# Patient Record
Sex: FEMALE | Race: WHITE | Employment: PART TIME | URBAN - METROPOLITAN AREA
[De-identification: names, ages, dates, MRNs, and addresses within clinical notes are randomized per-mention and may not be internally consistent; named-entity substitution may affect disease eponyms.]

---

## 2021-04-10 ENCOUNTER — HOSPITAL ENCOUNTER (EMERGENCY)
Age: 21
Discharge: HOME | End: 2021-04-10
Attending: EMERGENCY MEDICINE
Payer: COMMERCIAL

## 2021-04-10 VITALS
BODY MASS INDEX: 22.5 KG/M2 | OXYGEN SATURATION: 98 % | RESPIRATION RATE: 20 BRPM | DIASTOLIC BLOOD PRESSURE: 82 MMHG | WEIGHT: 140 LBS | SYSTOLIC BLOOD PRESSURE: 139 MMHG | HEART RATE: 98 BPM | HEIGHT: 66 IN | TEMPERATURE: 98.4 F

## 2021-04-10 DIAGNOSIS — R51.9 ACUTE NONINTRACTABLE HEADACHE, UNSPECIFIED HEADACHE TYPE: ICD-10-CM

## 2021-04-10 DIAGNOSIS — F41.1 ANXIETY STATE: ICD-10-CM

## 2021-04-10 DIAGNOSIS — S09.90XA CLOSED HEAD INJURY, INITIAL ENCOUNTER: Primary | ICD-10-CM

## 2021-04-10 PROCEDURE — 99284 EMERGENCY DEPT VISIT MOD MDM: CPT

## 2021-04-10 PROCEDURE — 6360000002 HC RX W HCPCS: Performed by: EMERGENCY MEDICINE

## 2021-04-10 PROCEDURE — 96372 THER/PROPH/DIAG INJ SC/IM: CPT

## 2021-04-10 RX ORDER — PROCHLORPERAZINE EDISYLATE 5 MG/ML
10 INJECTION INTRAMUSCULAR; INTRAVENOUS ONCE
Status: COMPLETED | OUTPATIENT
Start: 2021-04-10 | End: 2021-04-10

## 2021-04-10 RX ORDER — DIPHENHYDRAMINE HYDROCHLORIDE 50 MG/ML
25 INJECTION INTRAMUSCULAR; INTRAVENOUS ONCE
Status: COMPLETED | OUTPATIENT
Start: 2021-04-10 | End: 2021-04-10

## 2021-04-10 RX ORDER — KETOROLAC TROMETHAMINE 30 MG/ML
30 INJECTION, SOLUTION INTRAMUSCULAR; INTRAVENOUS ONCE
Status: COMPLETED | OUTPATIENT
Start: 2021-04-10 | End: 2021-04-10

## 2021-04-10 RX ORDER — DEXAMETHASONE SODIUM PHOSPHATE 10 MG/ML
8 INJECTION INTRAMUSCULAR; INTRAVENOUS ONCE
Status: COMPLETED | OUTPATIENT
Start: 2021-04-10 | End: 2021-04-10

## 2021-04-10 RX ADMIN — KETOROLAC TROMETHAMINE 30 MG: 30 INJECTION, SOLUTION INTRAMUSCULAR; INTRAVENOUS at 03:29

## 2021-04-10 RX ADMIN — DIPHENHYDRAMINE HYDROCHLORIDE 25 MG: 50 INJECTION, SOLUTION INTRAMUSCULAR; INTRAVENOUS at 03:29

## 2021-04-10 RX ADMIN — DEXAMETHASONE SODIUM PHOSPHATE 8 MG: 10 INJECTION INTRAMUSCULAR; INTRAVENOUS at 03:29

## 2021-04-10 RX ADMIN — PROCHLORPERAZINE EDISYLATE 10 MG: 5 INJECTION INTRAMUSCULAR; INTRAVENOUS at 03:29

## 2021-04-10 ASSESSMENT — PAIN SCALES - GENERAL: PAINLEVEL_OUTOF10: 5

## 2021-04-10 NOTE — ED PROVIDER NOTES
[address] [facility] ED  [address] [address] [facility] [address]  Phone: [phone]  Fax: [phone]      Pt Name: Guerline Fishman  [id number]  [place] 2000  Date of evaluation: 4/10/2021      CHIEF COMPLAINT       Chief Complaint   Patient presents with    Head Injury     patient sts she hit head on top of pizza oven. HISTORY OF PRESENT ILLNESS   Guerline Fishman is a 24 y.o. female with history of anxiety, ADD, and daily headaches who presents for evaluation of a head injury. The patient reports that around 6:50 PM tonight she was cleaning at work and accidentally bumped the top of her head on the metal portion of a pizza oven. She states that she immediately developed sharp, stabbing, nonradiating pain to the top of her head but denies loss of consciousness. She states that she has daily, global, sharp, stabbing, generalized headaches and states that her entire family has history of daily headaches. The patient states that she is not established with a PCP or neurologist and has never had her headaches evaluated. The patient took Excedrin tonight without improvement. She complains of associated nausea, photophobia and phonophobia. The patient denies fever, chills, vision changes, neck pain, back pain, chest pain, shortness of breath, abdominal pain, urinary/bowel symptoms, focal weakness, numbness, tingling, recent illness. REVIEW OF SYSTEMS     Ten point review of systems was reviewed and is negative unless otherwise noted in the HPI    [address]    has a past medical history of ADD (attention deficit disorder) and Anxiety. SURGICAL HISTORY      has no past surgical history on file.   The patient denies    CURRENT MEDICATIONS       Discharge Medication List as of 4/10/2021  4:00 AM      CONTINUE these medications which have NOT CHANGED    Details   Lisdexamfetamine Dimesylate (VYVANSE PO) Take by mouthHistorical Med      buPROPion HCl (WELLBUTRIN XL PO) Take by mouthHistorical Med             ALLERGIES     has No Known Allergies. FAMILY HISTORY     has no family status information on file. family history is not on file. SOCIAL HISTORY      reports that she has never smoked. She has never used smokeless tobacco. She reports that she does not drink alcohol or use drugs. PHYSICAL EXAM     INITIAL VITALS:  height is 5' 6\" (1.676 m) and weight is 63.5 kg (140 lb). Her oral temperature is 98.4 °F (36.9 °C). Her blood pressure is 139/82 and her pulse is 98. Her respiration is 20 and oxygen saturation is 98%. CONSTITUTIONAL: Extremely anxious, nontoxic  SKIN: warm, dry, no jaundice, hives or petechiae  EYES: clear conjunctiva, non-icteric sclera  HENT: normocephalic, atraumatic, moist mucus membranes. No hemotympanum, sheikh sign, raccoon eyes or septal hematoma. NECK: Nontender and supple with no nuchal rigidity, full range of motion  PULMONARY: clear to auscultation without wheezes, rhonchi, or rales, normal excursion, no accessory muscle use and no stridor  CARDIOVASCULAR: regular rate, rhythm. Strong radial pulses with intact distal perfusion. Capillary refill <2 seconds. GASTROINTESTINAL: soft, non-tender, non-distended, no palpable masses, no rebound or guarding   GENITOURINARY: No costovertebral angle tenderness to palpation  MUSCULOSKELETAL: No midline spinal tenderness, step off or deformity. Extremities are otherwise nontender to palpation and nonerythematous. Compartments soft. No peripheral edema. NEUROLOGIC: alert and oriented x 3, GCS 15, normal mentation and speech. Moves all extremities x 4 without motor or sensory deficit, gait is stable without ataxia. Cranial nerves II through XII intact. No cerebellar signs. No pronator drift. Normal finger-to-nose. Normal heel-to-shin. PSYCHIATRIC: normal mood and affect, thought process is clear and linear    DIAGNOSTIC RESULTS     EKG:  None    RADIOLOGY:   No results found.     LABS:  No results found for this visit on 04/10/21. EMERGENCY DEPARTMENT COURSE:        The patient was given the following medications:  Orders Placed This Encounter   Medications    ketorolac (TORADOL) injection 30 mg    dexamethasone (DECADRON) injection 8 mg    diphenhydrAMINE (BENADRYL) injection 25 mg    prochlorperazine (COMPAZINE) injection 10 mg        Vitals:    Vitals:    04/10/21 0247 04/10/21 0310 04/10/21 0415   BP:  139/82    Pulse:  101 98   Resp:  20    Temp:  98.4 °F (36.9 °C)    TempSrc: Oral Oral    SpO2:  98%    Weight: 63.5 kg (140 lb)     Height: 5' 6\" (1.676 m)       -------------------------  BP: 139/82, Temp: 98.4 °F (36.9 °C), Heart Rate: 98, Resp: 20    CONSULTS:  None    CRITICAL CARE:   None    PROCEDURES:  None    DIAGNOSIS/ MDM:   Madeline Ray is a 24 y.o. female who presents with headache after striking her head at work. Vital signs are stable. Heart regular rate and rhythm. Lungs clear to auscultation. Abdomen soft and nontender. No neurologic deficits on exam.  Normal gait. No signs of basilar skull fracture. The patient is extremely anxious and it took multiple conversations and time to come up with a plan that she was comfortable with. She ultimately declined imaging and she agreed to IM injections of Toradol, Benadryl, Compazine and Decadron. She had improvement in her headache and is requesting discharge. I have low suspicion for intracranial hemorrhage, skull fracture, or infection. I suspect she has chronic migraines and should have these evaluated. She may be developing a concussion. She will need to follow-up with occupational health and was given information to do this. She was instructed to take Tylenol as needed for pain and to follow-up in 1 to 2 days. She was instructed to return to the ED for worsening symptoms or any other concern.  The patient understands that at this time there is no evidence for a more malignant underlying process, but also understands that early in the process of an illness or injury, an emergency department work-up can be falsely reassuring. Routine discharge counseling was given, and the patient understands that worsening, changing or persistent symptoms should prompt a immediate call or follow-up with their primary care physician or return to the emergency department. The importance of appropriate follow-up was also discussed. I have reviewed the disposition diagnosis with the patient. I have answered their questions and given discharge instructions. They voiced understanding of these instructions and did not have any further questions or complaints. FINAL IMPRESSION      1. Closed head injury, initial encounter    2. Acute nonintractable headache, unspecified headache type    3. Anxiety state          DISPOSITION/PLAN   DISPOSITION Decision To Discharge 04/10/2021 03:59:41 AM        PATIENT REFERRED TO:  Mickey Ferrera MD  [address] [place] [facility] [address], [address]  [address]  [phone]    Schedule an appointment as soon as possible for a visit in 2 days      [facility] ED  [address]   [address] [address]  [phone]  Go to   If symptoms worsen    [address]  [phone]  Schedule an appointment as soon as possible for a visit in 1 day        DISCHARGE MEDICATIONS:  Discharge Medication List as of 4/10/2021  4:00 AM          (Please note that portions of this note were completed with a voice recognitionprogram.  Efforts were made to edit the dictations but occasionally words are mis-transcribed.)    [address]  Emergency Physician Attending         Ana Martinez DO  04/10/21 4232